# Patient Record
Sex: MALE | Race: WHITE | HISPANIC OR LATINO | Employment: UNEMPLOYED | URBAN - METROPOLITAN AREA
[De-identification: names, ages, dates, MRNs, and addresses within clinical notes are randomized per-mention and may not be internally consistent; named-entity substitution may affect disease eponyms.]

---

## 2017-04-24 ENCOUNTER — HOSPITAL ENCOUNTER (EMERGENCY)
Facility: MEDICAL CENTER | Age: 22
End: 2017-04-24
Attending: EMERGENCY MEDICINE
Payer: MEDICAID

## 2017-04-24 ENCOUNTER — APPOINTMENT (OUTPATIENT)
Dept: RADIOLOGY | Facility: MEDICAL CENTER | Age: 22
End: 2017-04-24
Payer: MEDICAID

## 2017-04-24 ENCOUNTER — APPOINTMENT (OUTPATIENT)
Dept: RADIOLOGY | Facility: MEDICAL CENTER | Age: 22
End: 2017-04-24
Attending: EMERGENCY MEDICINE
Payer: MEDICAID

## 2017-04-24 VITALS
SYSTOLIC BLOOD PRESSURE: 157 MMHG | TEMPERATURE: 100 F | OXYGEN SATURATION: 97 % | WEIGHT: 172.84 LBS | HEIGHT: 69 IN | RESPIRATION RATE: 20 BRPM | DIASTOLIC BLOOD PRESSURE: 105 MMHG | BODY MASS INDEX: 25.6 KG/M2 | HEART RATE: 129 BPM

## 2017-04-24 DIAGNOSIS — R00.0 TACHYCARDIA: ICD-10-CM

## 2017-04-24 DIAGNOSIS — T50.905A MEDICATION SIDE EFFECT, INITIAL ENCOUNTER: ICD-10-CM

## 2017-04-24 DIAGNOSIS — R00.2 PALPITATIONS: ICD-10-CM

## 2017-04-24 LAB
AMPHET UR QL SCN: NEGATIVE
APPEARANCE UR: CLEAR
APTT PPP: 30.6 SEC (ref 24.7–36)
BARBITURATES UR QL SCN: NEGATIVE
BENZODIAZ UR QL SCN: NEGATIVE
BILIRUB UR QL STRIP.AUTO: NEGATIVE
BZE UR QL SCN: NEGATIVE
CANNABINOIDS UR QL SCN: NEGATIVE
COLOR UR: NORMAL
EKG IMPRESSION: NORMAL
GLUCOSE UR STRIP.AUTO-MCNC: NEGATIVE MG/DL
INR PPP: 0.98 (ref 0.87–1.13)
KETONES UR STRIP.AUTO-MCNC: NEGATIVE MG/DL
LEUKOCYTE ESTERASE UR QL STRIP.AUTO: NEGATIVE
LIPASE SERPL-CCNC: 20 U/L (ref 11–82)
MDMA UR QL SCN: NEGATIVE
METHADONE UR QL SCN: NEGATIVE
MICRO URNS: NORMAL
NITRITE UR QL STRIP.AUTO: NEGATIVE
OPIATES UR QL SCN: NEGATIVE
OXYCODONE UR QL SCN: NEGATIVE
PCP UR QL SCN: NEGATIVE
PH UR STRIP.AUTO: 7 [PH]
PROPOXYPH UR QL SCN: NEGATIVE
PROT UR QL STRIP: NEGATIVE MG/DL
PROTHROMBIN TIME: 13.3 SEC (ref 12–14.6)
RBC UR QL AUTO: NEGATIVE
SP GR UR STRIP.AUTO: 1.02
TROPONIN I SERPL-MCNC: 0.01 NG/ML (ref 0–0.04)

## 2017-04-24 PROCEDURE — 700111 HCHG RX REV CODE 636 W/ 250 OVERRIDE (IP): Performed by: EMERGENCY MEDICINE

## 2017-04-24 PROCEDURE — 84484 ASSAY OF TROPONIN QUANT: CPT

## 2017-04-24 PROCEDURE — 96361 HYDRATE IV INFUSION ADD-ON: CPT

## 2017-04-24 PROCEDURE — A9270 NON-COVERED ITEM OR SERVICE: HCPCS | Performed by: EMERGENCY MEDICINE

## 2017-04-24 PROCEDURE — 87086 URINE CULTURE/COLONY COUNT: CPT

## 2017-04-24 PROCEDURE — 85730 THROMBOPLASTIN TIME PARTIAL: CPT

## 2017-04-24 PROCEDURE — 99284 EMERGENCY DEPT VISIT MOD MDM: CPT

## 2017-04-24 PROCEDURE — 700105 HCHG RX REV CODE 258: Performed by: EMERGENCY MEDICINE

## 2017-04-24 PROCEDURE — 83690 ASSAY OF LIPASE: CPT

## 2017-04-24 PROCEDURE — 85610 PROTHROMBIN TIME: CPT

## 2017-04-24 PROCEDURE — 700102 HCHG RX REV CODE 250 W/ 637 OVERRIDE(OP): Performed by: EMERGENCY MEDICINE

## 2017-04-24 PROCEDURE — 81003 URINALYSIS AUTO W/O SCOPE: CPT | Mod: 59

## 2017-04-24 PROCEDURE — 96374 THER/PROPH/DIAG INJ IV PUSH: CPT

## 2017-04-24 PROCEDURE — 36415 COLL VENOUS BLD VENIPUNCTURE: CPT

## 2017-04-24 PROCEDURE — 71010 DX-CHEST-LIMITED (1 VIEW): CPT

## 2017-04-24 PROCEDURE — 87040 BLOOD CULTURE FOR BACTERIA: CPT

## 2017-04-24 PROCEDURE — 80307 DRUG TEST PRSMV CHEM ANLYZR: CPT

## 2017-04-24 PROCEDURE — 93005 ELECTROCARDIOGRAM TRACING: CPT

## 2017-04-24 RX ORDER — RISPERIDONE 1 MG/1
1 TABLET ORAL 2 TIMES DAILY
COMMUNITY
End: 2017-04-24

## 2017-04-24 RX ORDER — SODIUM CHLORIDE 9 MG/ML
1000 INJECTION, SOLUTION INTRAVENOUS ONCE
Status: COMPLETED | OUTPATIENT
Start: 2017-04-24 | End: 2017-04-24

## 2017-04-24 RX ORDER — LORAZEPAM 2 MG/ML
2 INJECTION INTRAMUSCULAR ONCE
Status: COMPLETED | OUTPATIENT
Start: 2017-04-24 | End: 2017-04-24

## 2017-04-24 RX ORDER — ACETAMINOPHEN 325 MG/1
650 TABLET ORAL ONCE
Status: COMPLETED | OUTPATIENT
Start: 2017-04-24 | End: 2017-04-24

## 2017-04-24 RX ADMIN — LORAZEPAM 2 MG: 2 INJECTION INTRAMUSCULAR; INTRAVENOUS at 05:23

## 2017-04-24 RX ADMIN — SODIUM CHLORIDE 1000 ML: 9 INJECTION, SOLUTION INTRAVENOUS at 04:20

## 2017-04-24 RX ADMIN — ACETAMINOPHEN 650 MG: 325 TABLET, FILM COATED ORAL at 05:23

## 2017-04-24 RX ADMIN — SODIUM CHLORIDE 1000 ML: 9 INJECTION, SOLUTION INTRAVENOUS at 05:24

## 2017-04-24 ASSESSMENT — PAIN SCALES - GENERAL
PAINLEVEL_OUTOF10: 0

## 2017-04-24 ASSESSMENT — LIFESTYLE VARIABLES: DO YOU DRINK ALCOHOL: NO

## 2017-04-24 NOTE — DISCHARGE INSTRUCTIONS
Palpitations  A palpitation is the feeling that your heartbeat is irregular or is faster than normal. It may feel like your heart is fluttering or skipping a beat. Palpitations are usually not a serious problem. However, in some cases, you may need further medical evaluation.  CAUSES   Palpitations can be caused by:  · Smoking.  · Caffeine or other stimulants, such as diet pills or energy drinks.  · Alcohol.  · Stress and anxiety.  · Strenuous physical activity.  · Fatigue.  · Certain medicines.  · Heart disease, especially if you have a history of irregular heart rhythms (arrhythmias), such as atrial fibrillation, atrial flutter, or supraventricular tachycardia.  · An improperly working pacemaker or defibrillator.  DIAGNOSIS   To find the cause of your palpitations, your health care provider will take your medical history and perform a physical exam. Your health care provider may also have you take a test called an ambulatory electrocardiogram (ECG). An ECG records your heartbeat patterns over a 24-hour period. You may also have other tests, such as:  · Transthoracic echocardiogram (TTE). During echocardiography, sound waves are used to evaluate how blood flows through your heart.  · Transesophageal echocardiogram (GRACIE).  · Cardiac monitoring. This allows your health care provider to monitor your heart rate and rhythm in real time.  · Holter monitor. This is a portable device that records your heartbeat and can help diagnose heart arrhythmias. It allows your health care provider to track your heart activity for several days, if needed.  · Stress tests by exercise or by giving medicine that makes the heart beat faster.  TREATMENT   Treatment of palpitations depends on the cause of your symptoms and can vary greatly. Most cases of palpitations do not require any treatment other than time, relaxation, and monitoring your symptoms. Other causes, such as atrial fibrillation, atrial flutter, or supraventricular  tachycardia, usually require further treatment.  HOME CARE INSTRUCTIONS   · Avoid:  ¨ Caffeinated coffee, tea, soft drinks, diet pills, and energy drinks.  ¨ Chocolate.  ¨ Alcohol.  · Stop smoking if you smoke.  · Reduce your stress and anxiety. Things that can help you relax include:  ¨ A method of controlling things in your body, such as your heartbeats, with your mind (biofeedback).  ¨ Yoga.  ¨ Meditation.  ¨ Physical activity such as swimming, jogging, or walking.  · Get plenty of rest and sleep.  SEEK MEDICAL CARE IF:   · You continue to have a fast or irregular heartbeat beyond 24 hours.  · Your palpitations occur more often.  SEEK IMMEDIATE MEDICAL CARE IF:  · You have chest pain or shortness of breath.  · You have a severe headache.  · You feel dizzy or you faint.  MAKE SURE YOU:  · Understand these instructions.  · Will watch your condition.  · Will get help right away if you are not doing well or get worse.     This information is not intended to replace advice given to you by your health care provider. Make sure you discuss any questions you have with your health care provider.     Document Released: 12/15/2001 Document Revised: 12/23/2014 Document Reviewed: 02/15/2013  Elsevier Interactive Patient Education ©2016 Elsevier Inc.

## 2017-04-24 NOTE — ED AVS SNAPSHOT
4/24/2017    Cedric Lombardo  [address] [facility] [address]    Dear Cedric:    [facility] wants to ensure your discharge home is safe and you or your loved ones have had all of your questions answered regarding your care after you leave the hospital.    Below is a list of resources and contact information should you have any questions regarding your hospital stay, follow-up instructions, or active medical symptoms.    Questions or Concerns Regarding… Contact   Medical Questions Related to Your Discharge  (7 days a week, 8am-5pm) Contact a Nurse Care Coordinator   [phone]   Medical Questions Not Related to Your Discharge  (24 hours a day / 7 days a week)  Contact the Nurse Health Line   [phone]    Medications or Discharge Instructions Refer to your discharge packet   or contact your [facility] Primary Care Provider   [phone]   Follow-up Appointment(s) Schedule your appointment via TRAKLOK   or contact Scheduling [phone]   Billing Review your statement via TRAKLOK  or contact Billing [phone]   Medical Records Review your records via TRAKLOK   or contact Medical Records [phone]     You may receive a telephone call within two days of discharge. This call is to make certain you understand your discharge instructions and have the opportunity to have any questions answered. You can also easily access your medical information, test results and upcoming appointments via the TRAKLOK free online health management tool. You can learn more and sign up at [employer]/TRAKLOK. For assistance setting up your TRAKLOK account, please call [phone].    Once again, we want to ensure your discharge home is safe and that you have a clear understanding of any next steps in your care. If you have any questions or concerns, please do not hesitate to contact us, we are here for you. Thank you for choosing [facility] for your healthcare needs.    Sincerely,    Your [facility] Healthcare Team

## 2017-04-24 NOTE — ED NOTES
Patient noted to be agitated at this time - pacing in room, cursing at staff, and pulling off monitoring equipment. Patient demanding water and stating that he wants to leave and go see his daughter. Patient still tachycardic and complaining of palpitations with low grade fever at this time. Plan of care explained to patient. Patient states that he will stay another hour to see if he feels better. Patient placed back on gurney and monitoring equipment reattached.

## 2017-04-24 NOTE — ED NOTES
Cedric Lombardo  22 y.o.  male  Chief Complaint   Patient presents with   • Rapid Heart Beat     Present to triage c/o rapid heart beat started Saturday after taking risperdal. HR re-check in triage 154/ min. Also c/o sore throat / sob.

## 2017-04-24 NOTE — ED PROVIDER NOTES
"ED Provider Note    Scribed for Av Larsen M.D. by Willi Calderón. 4/24/2017  4:07 AM    Primary care provider: Pcp Pt States None  Means of arrival: Walk in  History obtained from: Patient  History limited by: None    CHIEF COMPLAINT  Chief Complaint   Patient presents with   • Rapid Heart Beat       HPI  Cedric Lombardo is a 22 y.o. male who presents to the Emergency Department for evaluation of fast heart rate onset one day ago. Patient states he started taking Risperdal two days ago. He reports experiencing palpitations of fast heart rate yesterday morning. He states not feeling well due to experiencing \"side effects.\" Patient notes associated sore throat and fevers. He denies chest pain. Patient states he consumes alcohol occasionally. He denies illicit drug use.    REVIEW OF SYSTEMS  Pertinent positives include palpitations of fast heart rate, sore throat, fevers.   Pertinent negatives include no chest pain.    All other systems reviewed and negative.    C    PAST MEDICAL HISTORY   has a past medical history of Heart murmur; Schizophrenia (CMS-HCC); and PTSD (post-traumatic stress disorder).    SURGICAL HISTORY  patient denies any surgical history    SOCIAL HISTORY  Social History   Substance Use Topics   • Smoking status: Current Every Day Smoker -- 1.00 packs/day     Types: Cigarettes   • Smokeless tobacco: Never Used   • Alcohol Use: Yes      Comment: socially      History   Drug Use No     Comment: \"cocaine is my drug of choice\"        FAMILY HISTORY  History reviewed. No pertinent family history.    CURRENT MEDICATIONS  Home Medications     Reviewed by Codi Grigsby R.N. (Registered Nurse) on 04/24/17 at 0336  Med List Status: Complete    Medication Last Dose Status    risperidone (RISPERDAL) 1 MG Tab 4/23/2017 Active                ALLERGIES  Allergies   Allergen Reactions   • Tylenol        PHYSICAL EXAM  VITAL SIGNS: /105 mmHg  Pulse 134  Temp(Src) 37.8 °C (100 °F)  Resp 16  Ht 1.753 " "m (5' 9\")  Wt 78.4 kg (172 lb 13.5 oz)  BMI 25.51 kg/m2  SpO2 96%  Nursing note and vitals reviewed.  Constitutional: Well-developed and well-nourished. No distress.   HENT: Head is normocephalic and atraumatic. Oropharynx is clear and moist without exudate or erythema.   Eyes: Pupils are equal, round, and reactive to light. Conjunctiva are normal.   Cardiovascular: Tachycardic and regular rhythm. No murmur heard. Normal radial pulses.  Pulmonary/Chest: Breath sounds normal. No wheezes or rales.   Abdominal: Soft and non-tender. No distention    Musculoskeletal: Extremities exhibit normal range of motion without edema or tenderness. No muscular rigidity. No lead pipe rigidity.  Neurological: Awake, alert and oriented to person, place, and time. No focal deficits noted.  Skin: Skin is warm and dry. No rash.   Psychiatric: Normal mood and affect. Appropriate for clinical situation. Slightly anxious    DIAGNOSTIC STUDIES / PROCEDURES    EKG Interpretation  See labs below. Interpreted by me.    LABS  Results for orders placed or performed during the hospital encounter of 04/24/17   Troponin   Result Value Ref Range    Troponin I 0.01 0.00 - 0.04 ng/mL   Prothrombin Time   Result Value Ref Range    PT 13.3 12.0 - 14.6 sec    INR 0.98 0.87 - 1.13   APTT   Result Value Ref Range    APTT 30.6 24.7 - 36.0 sec   Lipase   Result Value Ref Range    Lipase 20 11 - 82 U/L   URINALYSIS   Result Value Ref Range    Color Lt. Yellow     Character Clear     Specific Gravity 1.024 <1.035    Ph 7.0 5.0-8.0    Glucose Negative Negative mg/dL    Ketones Negative Negative mg/dL    Protein Negative Negative mg/dL    Bilirubin Negative Negative    Nitrite Negative Negative    Leukocyte Esterase Negative Negative    Occult Blood Negative Negative    Micro Urine Req see below    URINE DRUG SCREEN   Result Value Ref Range    Amphetamines Urine Negative Negative    Barbiturates Negative Negative    Benzodiazepines Negative Negative    Cocaine " Metabolite Negative Negative    Methadone Negative Negative    Ecstasy Negative Negative    Opiates Negative Negative    Oxycodone Negative Negative    Phencyclidine -Pcp Negative Negative    Propoxyphene Negative Negative    Cannabinoid Metab Negative Negative   EKG (ER)   Result Value Ref Range    Report       [facility] Emergency Dept.    Test Date:  2017  Pt Name:    BEAU BRAVO                  Department: ER  MRN:        [id number]                      Room:  Gender:                                 Technician: [id number]  :        1995                   Requested By:ER TRIAGE PROTOCOL  Order #:    [id number]                    Reading MD: SHERI MALIK MD    Measurements  Intervals                                Axis  Rate:       133                          P:          78  WY:         140                          QRS:        79  QRSD:       84                           T:          -22  QT:         272  QTc:        405    Interpretive Statements  SINUS TACHYCARDIA  ALDEN, CONSIDER BIATRIAL ABNORMALITIES  LEFT VENTRICULAR HYPERTROPHY  INFERIOR Q WAVES, PROBABLY NORMAL VARIATION  NONSPECIFIC T ABNORMALITIES, INFERIOR LEADS  Compared to ECG 2016 16:57:57  No significant changes    Electronically Signed On 2017 4:09:42 PDT by SHERI MALIK MD        All labs reviewed by me.    RADIOLOGY  DX-CHEST-LIMITED (1 VIEW)   Final Result      Negative single view of the chest.        The radiologist's interpretation of all radiological studies have been reviewed by me.    COURSE & MEDICAL DECISION MAKING  Nursing notes, VS, PMSFHx reviewed in chart.     Review of past medical records shows the patient was last here on 2016 for back pain.     4:07 AM - Patient seen and examined at bedside. The patient will be resuscitated with 1L NS IV due to tachycardia.  Patient will be treated with Ativan 2 mg, Tylenol 650 mg. Ordered DX chest, urine drug screen, urinalysis, urine culture,  blood culture, troponin, PTT, APTT, lipase, EKG to evaluate his symptoms. The differential diagnoses include but are not limited to: medication side effect, drug intoxication, dehydration     6:17 AM Patient reevaluated at bedside. Advised patient no stop taking the Risperdal. He plans to return to [place] tomorrow and will discuss psychiatric treatment with his physician.     The patient was discharged home with an information sheet on palpitations and told to return immediately for any signs or symptoms listed.  The patient agreed to the discharge precautions and follow-up plan which is documented in EPIC.       The patient will return for new or worsening symptoms and is stable at the time of discharge.    The patient is referred to a primary physician for blood pressure management, diabetic screening, and for all other preventative health concerns.    DISPOSITION:  Patient will be discharged home in stable condition.    FOLLOW UP:  [facility], Emergency Dept  [address] [place] [id number] [phone]    If symptoms worsen      OUTPATIENT MEDICATIONS:  There are no discharge medications for this patient.         FINAL IMPRESSION  1. Palpitations    2. Tachycardia    3. Medication side effect, initial encounter          Willi BARRERA (Shashi), am scribing for, and in the presence of, Av Larsen M.D..    Electronically signed by: Willi Calderón (Shashi), 4/24/2017    Av BARRERA M.D. personally performed the services described in this documentation, as scribed by Willi Calderón in my presence, and it is both accurate and complete.    The note accurately reflects work and decisions made by me.  Av Larsen  4/24/2017  11:35 AM

## 2017-04-24 NOTE — ED AVS SNAPSHOT
Enpirion Access Code: [email or web address]  Expires: 5/24/2017  6:22 AM    Your email address is not on file at [facility].  Email Addresses are required for you to sign up for Enpirion, please contact [phone] to verify your personal information and to provide your email address prior to attempting to register for Enpirion.    Cedric Lombardo  [address] [place] [address]    Enpirion  A secure, online tool to manage your health information     [facility]’s Enpirion® is a secure, online tool that connects you to your personalized health information from the privacy of your home -- day or night - making it very easy for you to manage your healthcare. Once the activation process is completed, you can even access your medical information using the Enpirion felipe, which is available for free in the Apple Felipe store or Google Play store.     To learn more about Enpirion, visit [email or web address]    There are two levels of access available (as shown below):   My Chart Features  [facility] Primary Care Doctor [facility]  Specialists [facility]  Urgent  Care Non-[facility] Primary Care Doctor   Email your healthcare team securely and privately 24/7 X X X    Manage appointments: schedule your next appointment; view details of past/upcoming appointments X      Request prescription refills. X      View recent personal medical records, including lab and immunizations X X X X   View health record, including health history, allergies, medications X X X X   Read reports about your outpatient visits, procedures, consult and ER notes X X X X   See your discharge summary, which is a recap of your hospital and/or ER visit that includes your diagnosis, lab results, and care plan X X  X     How to register for Naterat:  Once your e-mail address has been verified, follow the following steps to sign up for Naterat.     1. Go to  [email or web address]  2. Click on the Sign Up Now box, which takes you to the New Member Sign Up page. You will  need to provide the following information:  a. Enter your [employer] Access Code exactly as it appears at the top of this page. (You will not need to use this code after you’ve completed the sign-up process. If you do not sign up before the expiration date, you must request a new code.)   b. Enter your date of birth.   c. Enter your home email address.   d. Click Submit, and follow the next screen’s instructions.  3. Create a [employer] Keyst ID. This will be your Channelkit login ID and cannot be changed, so think of one that is secure and easy to remember.  4. Create a Channelkit password. You can change your password at any time.  5. Enter your Password Reset Question and Answer. This can be used at a later time if you forget your password.   6. Enter your e-mail address. This allows you to receive e-mail notifications when new information is available in Channelkit.  7. Click Sign Up. You can now view your health information.    For assistance activating your Channelkit account, call [phone]

## 2017-04-24 NOTE — ED AVS SNAPSHOT
Home Care Instructions                                                                                                                Cedric Lombardo   MRN: [id number]    Department:  [facility], Emergency Dept   Date of Visit:  4/24/2017            [facility], Emergency Dept    [address] [address]    Phone:  [phone]      You were seen by     Av Larsen M.D.      Your Diagnosis Was     Palpitations     R00.2       These are the medications you received during your hospitalization from 04/24/2017 0314 to 04/24/2017 0622     Date/Time Order Dose Route Action    04/24/2017 0523 lorazepam (ATIVAN) injection 2 mg 2 mg Intravenous Given    04/24/2017 0523 acetaminophen (TYLENOL) tablet 650 mg 650 mg Oral Given    04/24/2017 0524 NS infusion 1,000 mL 1,000 mL Intravenous New Bag    04/24/2017 0420 NS infusion 1,000 mL 1,000 mL Intravenous New Bag      Follow-up Information     1. Follow up with [facility], Emergency Dept.    Specialty:  Emergency Medicine    Why:  If symptoms worsen    Contact information    [address] [place] [id number] [phone]      Medication Information     Review all of your home medications and newly ordered medications with your primary doctor and/or pharmacist as soon as possible. Follow medication instructions as directed by your doctor and/or pharmacist.     Please keep your complete medication list with you and share with your physician. Update the information when medications are discontinued, doses are changed, or new medications (including over-the-counter products) are added; and carry medication information at all times in the event of emergency situations.               Medication List      Notice     You have not been prescribed any medications.            Procedures and tests performed during your visit     Procedure/Test Number of Times Performed    APTT 1    BLOOD CULTURE 2    Cardiac  Monitoring 1    DX-CHEST-LIMITED (1 VIEW) 1    EKG (ER) 1    Lipase 1    Maintain O2 sats greater than 94% 1    Oxygen Therapy per Protocol 1    Prothrombin Time 1    Saline Lock 1    Troponin 1    URINALYSIS 1    URINE CULTURE(NEW) 1    URINE DRUG SCREEN 1        Discharge Instructions       Palpitations  A palpitation is the feeling that your heartbeat is irregular or is faster than normal. It may feel like your heart is fluttering or skipping a beat. Palpitations are usually not a serious problem. However, in some cases, you may need further medical evaluation.  CAUSES   Palpitations can be caused by:  · Smoking.  · Caffeine or other stimulants, such as diet pills or energy drinks.  · Alcohol.  · Stress and anxiety.  · Strenuous physical activity.  · Fatigue.  · Certain medicines.  · Heart disease, especially if you have a history of irregular heart rhythms (arrhythmias), such as atrial fibrillation, atrial flutter, or supraventricular tachycardia.  · An improperly working pacemaker or defibrillator.  DIAGNOSIS   To find the cause of your palpitations, your health care provider will take your medical history and perform a physical exam. Your health care provider may also have you take a test called an ambulatory electrocardiogram (ECG). An ECG records your heartbeat patterns over a 24-hour period. You may also have other tests, such as:  · Transthoracic echocardiogram (TTE). During echocardiography, sound waves are used to evaluate how blood flows through your heart.  · Transesophageal echocardiogram (GRACIE).  · Cardiac monitoring. This allows your health care provider to monitor your heart rate and rhythm in real time.  · Holter monitor. This is a portable device that records your heartbeat and can help diagnose heart arrhythmias. It allows your health care provider to track your heart activity for several days, if needed.  · Stress tests by exercise or by giving medicine that makes the heart beat  faster.  TREATMENT   Treatment of palpitations depends on the cause of your symptoms and can vary greatly. Most cases of palpitations do not require any treatment other than time, relaxation, and monitoring your symptoms. Other causes, such as atrial fibrillation, atrial flutter, or supraventricular tachycardia, usually require further treatment.  HOME CARE INSTRUCTIONS   · Avoid:  ¨ Caffeinated coffee, tea, soft drinks, diet pills, and energy drinks.  ¨ Chocolate.  ¨ Alcohol.  · Stop smoking if you smoke.  · Reduce your stress and anxiety. Things that can help you relax include:  ¨ A method of controlling things in your body, such as your heartbeats, with your mind (biofeedback).  ¨ Yoga.  ¨ Meditation.  ¨ Physical activity such as swimming, jogging, or walking.  · Get plenty of rest and sleep.  SEEK MEDICAL CARE IF:   · You continue to have a fast or irregular heartbeat beyond 24 hours.  · Your palpitations occur more often.  SEEK IMMEDIATE MEDICAL CARE IF:  · You have chest pain or shortness of breath.  · You have a severe headache.  · You feel dizzy or you faint.  MAKE SURE YOU:  · Understand these instructions.  · Will watch your condition.  · Will get help right away if you are not doing well or get worse.     This information is not intended to replace advice given to you by your health care provider. Make sure you discuss any questions you have with your health care provider.     Document Released: 12/15/2001 Document Revised: 12/23/2014 Document Reviewed: 02/15/2013  Elsevier Interactive Patient Education ©2016 [employer]            Patient Information     Patient Information    Following emergency treatment: all patient requiring follow-up care must return either to a private physician or a clinic if your condition worsens before you are able to obtain further medical attention, please return to the emergency room.     Billing Information    At [facility], we work to make the billing process  streamlined for our patients.  Our Representatives are here to answer any questions you may have regarding your hospital bill.  If you have insurance coverage and have supplied your insurance information to us, we will submit a claim to your insurer on your behalf.  Should you have any questions regarding your bill, we can be reached online or by phone as follows:  Online: You are able pay your bills online or live chat with our representatives about any billing questions you may have. We are here to help Monday - Friday from 8:00am to 7:30pm and 9:00am - 12:00pm on Saturdays.  Please visit [email or web address]  for more information.   Phone:  [phone] or [phone]    Please note that your emergency physician, surgeon, pathologist, radiologist, anesthesiologist, and other specialists are not employed by [facility] and will therefore bill separately for their services.  Please contact them directly for any questions concerning their bills at the numbers below:     Emergency Physician Services:  [phone]  [facility]:  [phone]  [facility]:  [phone]  [facility]:  [phone]    1. Your final bill may vary from the amount quoted upon discharge if all procedures are not complete at that time, or if your doctor has additional procedures of which we are not aware. You will receive an additional bill if you return to the Emergency Department at [facility] for suture removal regardless of the facility of which the sutures were placed.     2. Please arrange for settlement of this account at the emergency registration.    3. All self-pay accounts are due in full at the time of treatment.  If you are unable to meet this obligation then payment is expected within 4-5 days.     4. If you have had radiology studies (CT, X-ray, Ultrasound, MRI), you have received a preliminary result during your emergency department visit.  Please contact the radiology department [phone] to receive a copy of your final result. Please discuss the Final result with your primary physician or with the follow up physician provided.     Crisis Hotline:  [place] Crisis Hotline:  0-339-FBGASUS or 1-923.577.5600  [place] Crisis Hotline:    1-411.195.5240 or 028-483-0195         ED Discharge Follow Up Questions    1. In order to provide you with very good care, we would like to follow up with a phone call in the next few days.  May we have your permission to contact you?     YES /  NO    2. What is the best phone number to call you? (       )_____-__________    3. What is the best time to call you?      Morning  /  Afternoon  /  Evening                   Patient Signature:  ____________________________________________________________    Date:  ____________________________________________________________

## 2017-04-26 LAB
BACTERIA UR CULT: NORMAL
SIGNIFICANT IND 70042: NORMAL
SITE SITE: NORMAL
SOURCE SOURCE: NORMAL

## 2017-04-29 LAB
BACTERIA BLD CULT: NORMAL
BACTERIA BLD CULT: NORMAL
SIGNIFICANT IND 70042: NORMAL
SIGNIFICANT IND 70042: NORMAL
SITE SITE: NORMAL
SITE SITE: NORMAL
SOURCE SOURCE: NORMAL
SOURCE SOURCE: NORMAL

## 2023-04-08 NOTE — ED NOTES
Patient states that he was recently incarcerated, released about a month ago. Generalized body rash noted, worst to back. Patient states that he developed the rash while he was incarcerated and has been unable to get rid of it despite treatments at home. Complains of generalized itching. Patient also states that he was sick with a cold about a week ago.   62